# Patient Record
Sex: MALE | Race: WHITE | NOT HISPANIC OR LATINO | URBAN - METROPOLITAN AREA
[De-identification: names, ages, dates, MRNs, and addresses within clinical notes are randomized per-mention and may not be internally consistent; named-entity substitution may affect disease eponyms.]

---

## 2019-07-30 ENCOUNTER — EMERGENCY (EMERGENCY)
Facility: HOSPITAL | Age: 74
LOS: 1 days | Discharge: HOME | End: 2019-07-30
Admitting: EMERGENCY MEDICINE
Payer: MEDICARE

## 2019-07-30 VITALS
SYSTOLIC BLOOD PRESSURE: 179 MMHG | TEMPERATURE: 99 F | OXYGEN SATURATION: 95 % | RESPIRATION RATE: 16 BRPM | HEART RATE: 86 BPM | DIASTOLIC BLOOD PRESSURE: 79 MMHG

## 2019-07-30 DIAGNOSIS — Z96.0 PRESENCE OF UROGENITAL IMPLANTS: Chronic | ICD-10-CM

## 2019-07-30 DIAGNOSIS — Z90.79 ACQUIRED ABSENCE OF OTHER GENITAL ORGAN(S): Chronic | ICD-10-CM

## 2019-07-30 PROCEDURE — 99283 EMERGENCY DEPT VISIT LOW MDM: CPT

## 2019-07-30 NOTE — ED ADULT NURSE REASSESSMENT NOTE - NS ED NURSE REASSESS COMMENT FT1
Gonsalves catheter in place on arrival with leg bag. Irrigated with NS using sterile technique.  Strawberry colored urine flowing to gravity

## 2019-07-30 NOTE — ED PROVIDER NOTE - NSFOLLOWUPINSTRUCTIONS_ED_ALL_ED_FT
rest, hydrate well    return for abdominal pain, decreased urinary drainage, fever, chills, nausea, vomiting, any other concerns.    follow up as directed with [facility].    Urinary Retention    Urinary retention is the inability to completely empty your bladder. This is a common problem in older men, especially with enlarged prostates. If you are sent home with a ross catheter and a drainage system make sure to keep the drainage bag emptied and lower than your catheter. Keep the ross catheter in until you follow up with a urologist.    SEEK IMMEDIATE MEDICAL CARE IF YOU DEVELOP THE FOLLOWING SYMPTOMS: the catheter stops draining urine, the catheter falls out, abdominal pain, nausea/vomiting, or chills/fever.

## 2019-07-30 NOTE — ED PROVIDER NOTE - CLINICAL SUMMARY MEDICAL DECISION MAKING FREE TEXT BOX
73 yo male with ross s/p TURP that has stopped draining. discussed with [facility] physician Dr. Saul, ross irrigated, improved drainage, dc.

## 2019-07-30 NOTE — ED PROVIDER NOTE - PROGRESS NOTE DETAILS
Page went to neurology team to call back Spoke with Dr. Saul from [facility], pt currently has 18 in coude catheter that required irrigation last night due to several clots. Recommends attempting to irrigate it today and if absolutely necessary use 18-20 in catheter. F/u in 1 week for voiding trial. Unsuccessfully able to void without the catheter. ross irrigated and draining clear red urine, pt pain improved, will dc. answered patients questions and addressed concerns.

## 2019-07-30 NOTE — ED ADULT NURSE NOTE - CHIEF COMPLAINT QUOTE
patient here from [facility] s/p ureter stent placement and ross catheter placement; c/o urine leakign around catheter and retention; catheter drained around 3pm at hospital; just discharged

## 2019-07-30 NOTE — ED PROVIDER NOTE - OBJECTIVE STATEMENT
75 y/o Male with PMHx of internal kidney stent and TERT, allergy to Sulfa drugs, Penicillin and Cipro, presents to ED requesting catheter change. States he was released from the hospital at 3 pm with a new drained catheter. Later on, he felt discomfort and discovered he was leaking into his pants. No fever currently, is on abx. Pt is without any other complaints. 73 y/o Male with PMHx of internal kidney stent and TERT, allergy to Sulfa drugs, Penicillin and Cipro, presents to ED requesting catheter change. States he was released from the hospital at 3 pm with a drained catheter. Later on, he felt discomfort and discovered he was leaking into his pants. No fever currently, is on abx. Pt is without any other complaints. 75 y/o Male with PMHx of internal kidney stent and TART, allergy to Sulfa drugs, Penicillin and Cipro, presents to ED requesting catheter change. States he was released from the hospital at 3 pm with a drained catheter. Later on, he felt discomfort and discovered he was leaking into his pants. No fever currently, is on abx. Pt is without any other complaints. 73 y/o Male with PMHx of internal kidney stent and TURP, allergy to Sulfa drugs, Penicillin and Cipro, presents to ED requesting catheter change. States he was released from the [facility] at 3 pm with a drained catheter. Later on, he felt discomfort and discovered he was leaking into his pants. No fever currently, is on abx. Pt is without any other complaints.

## 2019-07-30 NOTE — ED ADULT TRIAGE NOTE - CHIEF COMPLAINT QUOTE
patient here from [facility] s/p ureter stent placement and ross catheter placement; c/o urine leakign around catheter and retention; catheter drained around 3pm at hospital; just discharged

## 2019-08-02 DIAGNOSIS — T83.098A OTHER MECHANICAL COMPLICATION OF OTHER URINARY CATHETER, INITIAL ENCOUNTER: ICD-10-CM
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
